# Patient Record
Sex: FEMALE | Race: WHITE
[De-identification: names, ages, dates, MRNs, and addresses within clinical notes are randomized per-mention and may not be internally consistent; named-entity substitution may affect disease eponyms.]

---

## 2019-07-06 ENCOUNTER — HOSPITAL ENCOUNTER (EMERGENCY)
Dept: HOSPITAL 62 - ER | Age: 19
Discharge: HOME | End: 2019-07-06
Payer: COMMERCIAL

## 2019-07-06 VITALS — DIASTOLIC BLOOD PRESSURE: 63 MMHG | SYSTOLIC BLOOD PRESSURE: 131 MMHG
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
## 2019-07-06 DIAGNOSIS — S83.91XA: Primary | ICD-10-CM

## 2019-07-06 PROCEDURE — L1830 KO IMMOB CANVAS LONG PRE OTS: HCPCS

## 2019-07-06 PROCEDURE — 73564 X-RAY EXAM KNEE 4 OR MORE: CPT

## 2019-07-06 PROCEDURE — 99283 EMERGENCY DEPT VISIT LOW MDM: CPT

## 2019-07-06 NOTE — RADIOLOGY REPORT (SQ)
EXAM DESCRIPTION:  KNEE RIGHT 4 VIEWS



COMPLETED DATE/TIME:  7/6/2019 5:50 pm



REASON FOR STUDY:  r knee pain



COMPARISON:  None.



NUMBER OF VIEWS:  Four views.



TECHNIQUE:  AP, lateral, and both oblique radiographic images acquired of the right knee.



LIMITATIONS:  None.



FINDINGS:  MINERALIZATION: Normal.

BONES: No acute fracture or dislocation.  No worrisome bone lesions.

JOINT: No effusion.

SOFT TISSUES: No soft tissue swelling.  No radio-opaque foreign body.

OTHER: No other significant finding.



IMPRESSION:  NEGATIVE STUDY OF THE RIGHT KNEE. NO RADIOGRAPHIC EVIDENCE OF ACUTE INJURY.



TECHNICAL DOCUMENTATION:  JOB ID:  [id number]

 2011 [employer]- All Rights Reserved



Reading location - IP/workstation name: [email or web address]

## 2019-07-06 NOTE — ER DOCUMENT REPORT
HPI





- HPI


Patient complains to provider of: Right knee pain


Time Seen by Provider: 07/06/19 17:33


Onset: Last week


Onset/Duration: Persistent


Quality of pain: Achy


Pain Level: 4


Context: 





Patient presents complaining of right knee pain.  Patient [place] and may have twisted the knee.  Patient 

complains of pain with weightbearing.


Associated Symptoms: Other - Right knee injury


Exacerbated by: Standing, Movement, Walking


Relieved by: Denies


Similar symptoms previously: No


Recently seen / treated by doctor: No





- ROS


ROS below otherwise negative: Yes


Systems Reviewed and Negative: Yes All other systems reviewed and negative





- NEURO


Neurology: DENIES: Weakness





- REPRODUCTIVE


Reproductive: REPORTS: Pregnant:





- MUSCULOSKELETAL


Musculoskeletal: REPORTS: Extremity pain





- DERM


Skin Color: Normal


Skin Problems: None





Past Medical History





- General


Information source: Patient, Parent





- Social History


Smoking Status: Never Smoker


Frequency of alcohol use: None


Drug Abuse: None


Occupation: Retail


Lives with: Family


Family History: Reviewed & Not Pertinent


Pulmonary Medical History: Reports: Hx Asthma


Renal/ Medical History: Denies: Hx Peritoneal Dialysis


Past Surgical History: Reports: Hx Oral Surgery





- Immunizations


Immunizations up to date: Yes


Hx Pneumococcal Vaccination: 01/01/01





Vertical Provider Document





- CONSTITUTIONAL


Agree With Documented VS: Yes


Exam Limitations: No Limitations


General Appearance: WD/WN, No Apparent Distress





- INFECTION CONTROL


TRAVEL OUTSIDE OF THE [place] IN LAST 30 DAYS: No





- HEENT


HEENT: Atraumatic, Normocephalic





- NECK


Neck: Normal Inspection





- RESPIRATORY


Respiratory: No Respiratory Distress





- CARDIOVASCULAR


Pulses: Normal: Dorsalis pedis





- MUSCULOSKELETAL/EXTREMETIES


Musculoskeletal/Extremeties: MAEW, Tender - Right knee joint tenderness to 

medial compartment, no joint effusion, no laxity with varus or valgus maneuvers.

 Patellar tendon intact.  Normal skin color and temperature overlying joint, No 

Edema.  negative: Eccymosis





- NEURO


Level of Consciousness: Awake, Alert, Appropriate


Motor/Sensory: No Motor Deficit





- DERM


Integumentary: Warm, Dry, No Rash





Course





- Vital Signs


Vital signs: 


                                        











Temp Pulse Resp BP Pulse Ox


 


 98.4 F   79   18   131/63 H  99 


 


 07/06/19 17:27  07/06/19 17:27  07/06/19 17:27  07/06/19 17:27  07/06/19 17:27














- Diagnostic Test


Radiology reviewed: Pending, Image reviewed





Procedures





- Immobilization


  ** Right Knee


Pre-Proc Neuro Vasc Exam: Normal


Immobilizer type: Knee immobilizer


Performed by: PCT


Post-Proc Neuro Vasc Exam: Normal


Alignment checked and good: Yes





Discharge





- Discharge


Clinical Impression: 


Right knee sprain


Qualifiers:


 Encounter type: initial encounter Involved ligament of knee: unspecified 

ligament Qualified Code(s): S83.91XA - Sprain of unspecified site of right knee,

initial encounter





Condition: Stable


Disposition: HOME, SELF-CARE


Instructions:  Acetaminophen, Use of Crutches ([facility]), Ice & Elevation ([facility]), Knee

Immobilizing Splint ([facility]), Sprained Knee ([facility])


Additional Instructions: 


Return immediately for any new or worsening symptoms





Followup with your primary care provider, call tomorrow to make a followup 

appointment





Weightbearing as tolerated





Follow-up with orthopedics for any persistent pain or problems


Forms:  Return to Work


Referrals: 


[facility] [Provider Group] - Follow up as needed